# Patient Record
Sex: FEMALE | Race: ASIAN | NOT HISPANIC OR LATINO | URBAN - METROPOLITAN AREA
[De-identification: names, ages, dates, MRNs, and addresses within clinical notes are randomized per-mention and may not be internally consistent; named-entity substitution may affect disease eponyms.]

---

## 2020-06-01 ENCOUNTER — TELEPHONE ENCOUNTER (OUTPATIENT)
Dept: URBAN - METROPOLITAN AREA CLINIC 98 | Facility: CLINIC | Age: 70
End: 2020-06-01

## 2020-06-01 RX ORDER — MESALAMINE 1.2 G/1
TAKE 4 TABLETS (4.8 GRAM) BY ORAL ROUTE ONCE DAILY WITH A MEAL FOR 90 DAYS TABLET, DELAYED RELEASE ORAL 1
Qty: 360 | Refills: 3

## 2020-06-02 ENCOUNTER — TELEPHONE ENCOUNTER (OUTPATIENT)
Dept: URBAN - METROPOLITAN AREA CLINIC 92 | Facility: CLINIC | Age: 70
End: 2020-06-02

## 2020-10-12 ENCOUNTER — TELEPHONE ENCOUNTER (OUTPATIENT)
Dept: URBAN - METROPOLITAN AREA CLINIC 98 | Facility: CLINIC | Age: 70
End: 2020-10-12

## 2020-10-12 RX ORDER — SODIUM, POTASSIUM,MAG SULFATES 17.5-3.13G
17.5-13.3-1.6 GM/177ML SOLUTION, RECONSTITUTED, ORAL ORAL
Qty: 1 BOX | OUTPATIENT
Start: 2020-10-13

## 2020-12-21 ENCOUNTER — OFFICE VISIT (OUTPATIENT)
Dept: URBAN - METROPOLITAN AREA SURGERY CENTER 18 | Facility: SURGERY CENTER | Age: 70
End: 2020-12-21

## 2021-02-22 ENCOUNTER — TELEPHONE ENCOUNTER (OUTPATIENT)
Dept: URBAN - METROPOLITAN AREA CLINIC 98 | Facility: CLINIC | Age: 71
End: 2021-02-22

## 2021-03-16 ENCOUNTER — TELEPHONE ENCOUNTER (OUTPATIENT)
Dept: URBAN - METROPOLITAN AREA CLINIC 98 | Facility: CLINIC | Age: 71
End: 2021-03-16

## 2021-03-18 ENCOUNTER — OFFICE VISIT (OUTPATIENT)
Dept: URBAN - METROPOLITAN AREA SURGERY CENTER 18 | Facility: SURGERY CENTER | Age: 71
End: 2021-03-18

## 2021-04-06 PROBLEM — 41364008: Status: ACTIVE | Noted: 2020-10-13

## 2021-04-12 ENCOUNTER — OFFICE VISIT (OUTPATIENT)
Dept: URBAN - METROPOLITAN AREA SURGERY CENTER 18 | Facility: SURGERY CENTER | Age: 71
End: 2021-04-12
Payer: COMMERCIAL

## 2021-04-12 ENCOUNTER — CLAIMS CREATED FROM THE CLAIM WINDOW (OUTPATIENT)
Dept: URBAN - METROPOLITAN AREA CLINIC 4 | Facility: CLINIC | Age: 71
End: 2021-04-12
Payer: COMMERCIAL

## 2021-04-12 DIAGNOSIS — K51.80 OTHER ULCERATIVE COLITIS WITHOUT COMPLICATIONS: ICD-10-CM

## 2021-04-12 DIAGNOSIS — K51.00 CHRONIC PANCOLONIC ULCERATIVE COLITIS: ICD-10-CM

## 2021-04-12 DIAGNOSIS — K63.89 STENOSIS OF ILEOCECAL VALVE: ICD-10-CM

## 2021-04-12 PROCEDURE — 88305 TISSUE EXAM BY PATHOLOGIST: CPT | Performed by: PATHOLOGY

## 2021-04-12 PROCEDURE — G8907 PT DOC NO EVENTS ON DISCHARG: HCPCS | Performed by: INTERNAL MEDICINE

## 2021-04-12 PROCEDURE — 45380 COLONOSCOPY AND BIOPSY: CPT | Performed by: INTERNAL MEDICINE

## 2021-04-12 RX ORDER — CYANOCOBALAMIN (VITAMIN B-12) 500 MCG
TABLET ORAL
Qty: 0 | Refills: 0 | Status: ACTIVE | COMMUNITY
Start: 1900-01-01 | End: 1900-01-01

## 2021-04-12 RX ORDER — RIBOFLAVIN (VITAMIN B2) 100 MG
TABLET ORAL
Qty: 0 | Refills: 0 | Status: ACTIVE | COMMUNITY
Start: 1900-01-01 | End: 1900-01-01

## 2021-04-12 RX ORDER — MESALAMINE 1.2 G/1
TAKE 4 TABLETS (4.8 GRAM) BY ORAL ROUTE ONCE DAILY WITH A MEAL FOR 90 DAYS TABLET, DELAYED RELEASE ORAL 1
Qty: 360 | Refills: 3 | Status: ACTIVE | COMMUNITY

## 2021-04-12 RX ORDER — SODIUM, POTASSIUM,MAG SULFATES 17.5-3.13G
17.5-13.3-1.6 GM/177ML SOLUTION, RECONSTITUTED, ORAL ORAL
Qty: 1 BOX | Status: ACTIVE | COMMUNITY
Start: 2020-10-13

## 2021-04-12 RX ORDER — DULOXETINE HCL 30 MG
CAPSULE,DELAYED RELEASE (ENTERIC COATED) ORAL
Qty: 0 | Refills: 0 | Status: ACTIVE | COMMUNITY
Start: 1900-01-01 | End: 1900-01-01

## 2021-07-06 ENCOUNTER — ERX REFILL RESPONSE (OUTPATIENT)
Dept: URBAN - METROPOLITAN AREA CLINIC 98 | Facility: CLINIC | Age: 71
End: 2021-07-06

## 2021-07-06 RX ORDER — MESALAMINE 1.2 G/1
TAKE 4 TABLETS (4.8 GRAM) BY ORAL ROUTE ONCE DAILY WITH A MEAL TABLET, DELAYED RELEASE ORAL
Qty: 120 TABLET | Refills: 12 | OUTPATIENT

## 2021-07-06 RX ORDER — MESALAMINE 1.2 G/1
TAKE 4 TABLETS (4.8 GRAM) BY ORAL ROUTE ONCE DAILY WITH A MEAL FOR 90 DAYS TABLET, DELAYED RELEASE ORAL 1
Qty: 360 | Refills: 3 | OUTPATIENT

## 2022-05-16 ENCOUNTER — TELEPHONE ENCOUNTER (OUTPATIENT)
Dept: URBAN - METROPOLITAN AREA CLINIC 6 | Facility: CLINIC | Age: 72
End: 2022-05-16

## 2022-05-16 RX ORDER — VALACYCLOVIR HYDROCHLORIDE 500 MG/1
1 TABLET TABLET, FILM COATED ORAL ONCE A DAY
Qty: 10 | OUTPATIENT
Start: 2022-05-16 | End: 2022-05-26

## 2022-05-16 RX ORDER — MESALAMINE 1.2 G/1
TAKE 4 TABLETS (4.8 GRAM) BY ORAL ROUTE ONCE DAILY WITH A MEAL TABLET, DELAYED RELEASE ORAL
Qty: 120 TABLET | Refills: 12

## 2022-09-14 ENCOUNTER — TELEPHONE ENCOUNTER (OUTPATIENT)
Dept: URBAN - METROPOLITAN AREA CLINIC 98 | Facility: CLINIC | Age: 72
End: 2022-09-14

## 2022-10-07 ENCOUNTER — TELEPHONE ENCOUNTER (OUTPATIENT)
Dept: URBAN - METROPOLITAN AREA CLINIC 6 | Facility: CLINIC | Age: 72
End: 2022-10-07

## 2023-03-17 ENCOUNTER — P2P PATIENT RECORD (OUTPATIENT)
Age: 73
End: 2023-03-17

## 2023-06-13 ENCOUNTER — CLAIMS CREATED FROM THE CLAIM WINDOW (OUTPATIENT)
Dept: URBAN - METROPOLITAN AREA CLINIC 4 | Facility: CLINIC | Age: 73
End: 2023-06-13
Payer: COMMERCIAL

## 2023-06-13 ENCOUNTER — TELEPHONE ENCOUNTER (OUTPATIENT)
Dept: URBAN - METROPOLITAN AREA CLINIC 98 | Facility: CLINIC | Age: 73
End: 2023-06-13

## 2023-06-13 ENCOUNTER — OFFICE VISIT (OUTPATIENT)
Dept: URBAN - METROPOLITAN AREA SURGERY CENTER 18 | Facility: SURGERY CENTER | Age: 73
End: 2023-06-13
Payer: COMMERCIAL

## 2023-06-13 DIAGNOSIS — K63.5 BENIGN COLON POLYP: ICD-10-CM

## 2023-06-13 DIAGNOSIS — K51.80 CHRONIC PANCOLONIC ULCERATIVE COLITIS: ICD-10-CM

## 2023-06-13 DIAGNOSIS — K51.80 OTHER ULCERATIVE COLITIS WITHOUT COMPLICATIONS: ICD-10-CM

## 2023-06-13 PROCEDURE — 88305 TISSUE EXAM BY PATHOLOGIST: CPT | Performed by: PATHOLOGY

## 2023-06-13 PROCEDURE — 45380 COLONOSCOPY AND BIOPSY: CPT | Performed by: INTERNAL MEDICINE

## 2023-06-13 PROCEDURE — 45385 COLONOSCOPY W/LESION REMOVAL: CPT | Performed by: INTERNAL MEDICINE

## 2023-06-13 PROCEDURE — G8907 PT DOC NO EVENTS ON DISCHARG: HCPCS | Performed by: INTERNAL MEDICINE

## 2023-06-13 RX ORDER — MESALAMINE 1.2 G/1
TAKE 4 TABLETS (4.8 GRAM) BY ORAL ROUTE ONCE DAILY WITH A MEAL TABLET, DELAYED RELEASE ORAL
Qty: 120 TABLET | Refills: 3
End: 2023-09-11

## 2023-06-13 RX ORDER — MESALAMINE 4 G/60ML
AS DIRECTED SUSPENSION RECTAL
Qty: 30 KIT | Refills: 1 | OUTPATIENT
Start: 2023-06-13

## 2023-06-13 RX ORDER — CYANOCOBALAMIN (VITAMIN B-12) 500 MCG
TABLET ORAL
Qty: 0 | Refills: 0 | Status: ACTIVE | COMMUNITY
Start: 1900-01-01 | End: 1900-01-01

## 2023-06-13 RX ORDER — DULOXETINE HCL 30 MG
CAPSULE,DELAYED RELEASE (ENTERIC COATED) ORAL
Qty: 0 | Refills: 0 | Status: ACTIVE | COMMUNITY
Start: 1900-01-01 | End: 1900-01-01

## 2023-06-13 RX ORDER — SODIUM, POTASSIUM,MAG SULFATES 17.5-3.13G
17.5-13.3-1.6 GM/177ML SOLUTION, RECONSTITUTED, ORAL ORAL
Qty: 1 BOX | Status: ACTIVE | COMMUNITY
Start: 2020-10-13

## 2023-06-13 RX ORDER — MESALAMINE 1.2 G/1
TAKE 4 TABLETS (4.8 GRAM) BY ORAL ROUTE ONCE DAILY WITH A MEAL TABLET, DELAYED RELEASE ORAL
Qty: 120 TABLET | Refills: 12 | Status: ACTIVE | COMMUNITY

## 2023-06-13 RX ORDER — RIBOFLAVIN (VITAMIN B2) 100 MG
TABLET ORAL
Qty: 0 | Refills: 0 | Status: ACTIVE | COMMUNITY
Start: 1900-01-01 | End: 1900-01-01

## 2023-12-27 ENCOUNTER — ERX REFILL RESPONSE (OUTPATIENT)
Dept: URBAN - METROPOLITAN AREA CLINIC 98 | Facility: CLINIC | Age: 73
End: 2023-12-27

## 2023-12-27 RX ORDER — MESALAMINE 1.2 G/1
TAKE 4 TABLETS (4.8 GRAM) BY ORAL ROUTE ONCE DAILY WITH A MEAL 90 DAYS TABLET, DELAYED RELEASE ORAL
Qty: 120 TABLET | Refills: 2 | OUTPATIENT

## 2023-12-27 RX ORDER — MESALAMINE 1.2 G/1
TAKE 4 TABLETS (4.8 GRAM) BY ORAL ROUTE ONCE DAILY WITH A MEAL 90 DAYS TABLET, DELAYED RELEASE ORAL
Qty: 120 TABLET | Refills: 3 | OUTPATIENT

## 2024-01-19 ENCOUNTER — TELEPHONE ENCOUNTER (OUTPATIENT)
Dept: URBAN - METROPOLITAN AREA CLINIC 97 | Facility: CLINIC | Age: 74
End: 2024-01-19

## 2024-01-19 RX ORDER — PREDNISONE 10 MG/1
2 TABLET TABLET ORAL ONCE A DAY
Qty: 180 TABLET | Refills: 0 | OUTPATIENT
Start: 2024-01-21 | End: 2024-04-20

## 2024-06-14 ENCOUNTER — DASHBOARD ENCOUNTERS (OUTPATIENT)
Age: 74
End: 2024-06-14

## 2024-06-14 ENCOUNTER — LAB OUTSIDE AN ENCOUNTER (OUTPATIENT)
Dept: URBAN - METROPOLITAN AREA CLINIC 98 | Facility: CLINIC | Age: 74
End: 2024-06-14

## 2024-06-14 ENCOUNTER — OFFICE VISIT (OUTPATIENT)
Dept: URBAN - METROPOLITAN AREA CLINIC 98 | Facility: CLINIC | Age: 74
End: 2024-06-14

## 2024-06-14 VITALS
WEIGHT: 114 LBS | SYSTOLIC BLOOD PRESSURE: 114 MMHG | HEART RATE: 78 BPM | BODY MASS INDEX: 19.46 KG/M2 | TEMPERATURE: 97.7 F | DIASTOLIC BLOOD PRESSURE: 68 MMHG | HEIGHT: 64 IN

## 2024-06-14 RX ORDER — CYANOCOBALAMIN (VITAMIN B-12) 500 MCG
TABLET ORAL
Qty: 0 | Refills: 0 | Status: ACTIVE | COMMUNITY
Start: 1900-01-01

## 2024-06-14 RX ORDER — DULOXETINE HCL 30 MG
CAPSULE,DELAYED RELEASE (ENTERIC COATED) ORAL
Qty: 0 | Refills: 0 | Status: ACTIVE | COMMUNITY
Start: 1900-01-01

## 2024-06-14 RX ORDER — RIBOFLAVIN (VITAMIN B2) 100 MG
TABLET ORAL
Qty: 0 | Refills: 0 | Status: ACTIVE | COMMUNITY
Start: 1900-01-01

## 2024-06-14 RX ORDER — SODIUM SULFATE, POTASSIUM SULFATE AND MAGNESIUM SULFATE ORAL 1.6; 3.13; 17.5 G/177ML; G/177ML; G/177ML
354ML SOLUTION ORAL
Qty: 354 ML | Refills: 0 | OUTPATIENT
Start: 2024-06-14 | End: 2024-06-15

## 2024-06-14 RX ORDER — SODIUM, POTASSIUM,MAG SULFATES 17.5-3.13G
17.5-13.3-1.6 GM/177ML SOLUTION, RECONSTITUTED, ORAL ORAL
Qty: 1 BOX | Status: ON HOLD | COMMUNITY
Start: 2020-10-13

## 2024-06-14 RX ORDER — MESALAMINE 1.2 G/1
TAKE 4 TABLETS (4.8 GRAM) BY ORAL ROUTE ONCE DAILY WITH A MEAL 90 DAYS TABLET, DELAYED RELEASE ORAL
Qty: 120 TABLET | Refills: 2 | Status: ACTIVE | COMMUNITY

## 2024-06-14 RX ORDER — MESALAMINE 4 G/60ML
AS DIRECTED SUSPENSION RECTAL
Qty: 30 KIT | Refills: 1 | Status: ACTIVE | COMMUNITY
Start: 2023-06-13

## 2024-06-14 NOTE — HPI-TODAY'S VISIT:
6/14/24 - Here to follow- up flare ulcerative colitis - Colonoscopy 6/13/23- active colitis - 2/5-3/19 food poisoning in [place]; vomiting, diarrhea - Diarrhea never went away - 4/2- ER [facility] Tx- e.coli, colonic inflammation; admitted for 5 days - Tx. steroids, antibiotics IV - Finished prednisone taper on 6/4 - Now- on mesalamine 4 capsules per day - BM 1 per day; dark stools on iron - Leaving the end of June 28th for [place]- 2 months - Patient concerned with "pin prick sensation all over body worse at nigh"

## 2024-06-17 ENCOUNTER — TELEPHONE ENCOUNTER (OUTPATIENT)
Dept: URBAN - METROPOLITAN AREA CLINIC 98 | Facility: CLINIC | Age: 74
End: 2024-06-17

## 2024-06-17 LAB
A/G RATIO: 1.7
ABSOLUTE BASOPHILS: 53
ABSOLUTE EOSINOPHILS: 38
ABSOLUTE LYMPHOCYTES: 2584
ABSOLUTE MONOCYTES: 722
ABSOLUTE NEUTROPHILS: 4203
ALBUMIN: 4.4
ALKALINE PHOSPHATASE: 57
ALT (SGPT): <3
AST (SGOT): 23
BASOPHILS: 0.7
BILIRUBIN, TOTAL: 0.4
BUN/CREATININE RATIO: (no result)
BUN: 12
C-REACTIVE PROTEIN, QUANT: <3
CALCIUM: 10.1
CARBON DIOXIDE, TOTAL: 28
CHLORIDE: 103
CREATININE: 0.66
EGFR: 93
EOSINOPHILS: 0.5
FOLATE, SERUM: >24
GLOBULIN, TOTAL: 2.6
GLUCOSE: 88
HEMATOCRIT: 37.7
HEMOGLOBIN: 12.1
LYMPHOCYTES: 34
MCH: 30.3
MCHC: 32.1
MCV: 94.3
MONOCYTES: 9.5
MPV: 10.1
NEUTROPHILS: 55.3
PLATELET COUNT: 355
POTASSIUM: 5.2
PROTEIN, TOTAL: 7
RDW: 15.2
RED BLOOD CELL COUNT: 4
SED RATE BY MODIFIED: 9
SODIUM: 140
VITAMIN B12: 423
VITAMIN D,25-OH,TOTAL,IA: 49
WHITE BLOOD CELL COUNT: 7.6

## 2024-06-29 LAB — CALPROTECTIN, FECAL: 50

## 2024-09-10 ENCOUNTER — OFFICE VISIT (OUTPATIENT)
Dept: URBAN - METROPOLITAN AREA SURGERY CENTER 18 | Facility: SURGERY CENTER | Age: 74
End: 2024-09-10

## 2024-10-08 ENCOUNTER — CLAIMS CREATED FROM THE CLAIM WINDOW (OUTPATIENT)
Dept: URBAN - METROPOLITAN AREA CLINIC 4 | Facility: CLINIC | Age: 74
End: 2024-10-08
Payer: COMMERCIAL

## 2024-10-08 ENCOUNTER — TELEPHONE ENCOUNTER (OUTPATIENT)
Dept: URBAN - METROPOLITAN AREA CLINIC 98 | Facility: CLINIC | Age: 74
End: 2024-10-08

## 2024-10-08 ENCOUNTER — OFFICE VISIT (OUTPATIENT)
Dept: URBAN - METROPOLITAN AREA SURGERY CENTER 18 | Facility: SURGERY CENTER | Age: 74
End: 2024-10-08
Payer: COMMERCIAL

## 2024-10-08 DIAGNOSIS — K51.80 OTHER ULCERATIVE COLITIS WITHOUT COMPLICATIONS: ICD-10-CM

## 2024-10-08 DIAGNOSIS — K51.80 OTHER ULCERATIVE COLITIS: ICD-10-CM

## 2024-10-08 DIAGNOSIS — K63.89 OTHER SPECIFIED DISEASES OF INTESTINE: ICD-10-CM

## 2024-10-08 PROCEDURE — 00812 ANES LWR INTST SCR COLSC: CPT | Performed by: ANESTHESIOLOGY

## 2024-10-08 PROCEDURE — 45380 COLONOSCOPY AND BIOPSY: CPT | Performed by: INTERNAL MEDICINE

## 2024-10-08 PROCEDURE — 88305 TISSUE EXAM BY PATHOLOGIST: CPT | Performed by: PATHOLOGY

## 2024-10-08 RX ORDER — CYANOCOBALAMIN (VITAMIN B-12) 500 MCG
TABLET ORAL
Qty: 0 | Refills: 0 | Status: ACTIVE | COMMUNITY
Start: 1900-01-01

## 2024-10-08 RX ORDER — SODIUM, POTASSIUM,MAG SULFATES 17.5-3.13G
17.5-13.3-1.6 GM/177ML SOLUTION, RECONSTITUTED, ORAL ORAL
Qty: 1 BOX | Status: ON HOLD | COMMUNITY
Start: 2020-10-13

## 2024-10-08 RX ORDER — MESALAMINE 1.2 G/1
TAKE 4 TABLETS (4.8 GRAM) BY ORAL ROUTE ONCE DAILY WITH A MEAL 90 DAYS TABLET, DELAYED RELEASE ORAL
Qty: 120 TABLET | Refills: 2 | Status: ACTIVE | COMMUNITY

## 2024-10-08 RX ORDER — DULOXETINE HCL 30 MG
CAPSULE,DELAYED RELEASE (ENTERIC COATED) ORAL
Qty: 0 | Refills: 0 | Status: ACTIVE | COMMUNITY
Start: 1900-01-01

## 2024-10-08 RX ORDER — RIBOFLAVIN (VITAMIN B2) 100 MG
TABLET ORAL
Qty: 0 | Refills: 0 | Status: ACTIVE | COMMUNITY
Start: 1900-01-01

## 2024-10-08 RX ORDER — MESALAMINE 4 G/60ML
AS DIRECTED SUSPENSION RECTAL
Qty: 30 KIT | Refills: 1 | Status: ACTIVE | COMMUNITY
Start: 2023-06-13

## 2024-10-10 ENCOUNTER — TELEPHONE ENCOUNTER (OUTPATIENT)
Dept: URBAN - METROPOLITAN AREA CLINIC 97 | Facility: CLINIC | Age: 74
End: 2024-10-10

## 2024-10-10 RX ORDER — MESALAMINE 4 G/60ML
AS DIRECTED SUSPENSION RECTAL
Qty: 45 KIT | Refills: 0 | OUTPATIENT
Start: 2024-10-17 | End: 2024-11-30

## 2024-10-24 ENCOUNTER — TELEPHONE ENCOUNTER (OUTPATIENT)
Dept: URBAN - METROPOLITAN AREA CLINIC 97 | Facility: CLINIC | Age: 74
End: 2024-10-24

## 2024-10-24 ENCOUNTER — OFFICE VISIT (OUTPATIENT)
Dept: URBAN - METROPOLITAN AREA CLINIC 98 | Facility: CLINIC | Age: 74
End: 2024-10-24
Payer: COMMERCIAL

## 2024-10-24 VITALS
HEIGHT: 64 IN | DIASTOLIC BLOOD PRESSURE: 65 MMHG | BODY MASS INDEX: 19.19 KG/M2 | TEMPERATURE: 97 F | WEIGHT: 112.4 LBS | SYSTOLIC BLOOD PRESSURE: 108 MMHG | HEART RATE: 90 BPM

## 2024-10-24 DIAGNOSIS — K51.318 ULCERATIVE RECTOSIGMOIDITIS WITH OTHER COMPLICATION: ICD-10-CM

## 2024-10-24 PROCEDURE — 99214 OFFICE O/P EST MOD 30 MIN: CPT | Performed by: INTERNAL MEDICINE

## 2024-10-24 RX ORDER — RIBOFLAVIN (VITAMIN B2) 100 MG
TABLET ORAL
Qty: 0 | Refills: 0 | Status: ON HOLD | COMMUNITY
Start: 1900-01-01

## 2024-10-24 RX ORDER — MESALAMINE 4 G/60ML
AS DIRECTED SUSPENSION RECTAL
Qty: 45 KIT | Refills: 0
Start: 2024-10-17 | End: 2024-12-08

## 2024-10-24 RX ORDER — MESALAMINE 4 G/60ML
AS DIRECTED SUSPENSION RECTAL
Qty: 30 KIT | Refills: 1 | Status: ON HOLD | COMMUNITY
Start: 2023-06-13

## 2024-10-24 RX ORDER — CYANOCOBALAMIN (VITAMIN B-12) 500 MCG
TABLET ORAL
Qty: 0 | Refills: 0 | Status: ON HOLD | COMMUNITY
Start: 1900-01-01

## 2024-10-24 RX ORDER — MESALAMINE 4 G/60ML
AS DIRECTED SUSPENSION RECTAL
Qty: 45 KIT | Refills: 0 | Status: ON HOLD | COMMUNITY
Start: 2024-10-17 | End: 2024-11-30

## 2024-10-24 RX ORDER — SODIUM, POTASSIUM,MAG SULFATES 17.5-3.13G
17.5-13.3-1.6 GM/177ML SOLUTION, RECONSTITUTED, ORAL ORAL
Qty: 1 BOX | COMMUNITY
Start: 2020-10-13

## 2024-10-24 RX ORDER — MESALAMINE 1.2 G/1
TAKE 4 TABLETS (4.8 GRAM) BY ORAL ROUTE ONCE DAILY WITH A MEAL 90 DAYS TABLET, DELAYED RELEASE ORAL
Qty: 120 TABLET | Refills: 2 | Status: ACTIVE | COMMUNITY

## 2024-10-24 RX ORDER — DULOXETINE HCL 30 MG
CAPSULE,DELAYED RELEASE (ENTERIC COATED) ORAL
Qty: 0 | Refills: 0 | Status: ON HOLD | COMMUNITY
Start: 1900-01-01

## 2024-10-24 NOTE — HPI-TODAY'S VISIT:
6/14/24- Maren High NP - Here to follow- up flare ulcerative colitis - Colonoscopy 6/13/23- active colitis - 2/5-3/19 food poisoning in [place]; vomiting, diarrhea - Diarrhea never went away - 4/2- ER [facility] Tx- e.coli, colonic inflammation; admitted for 5 days - Tx. steroids, antibiotics IV - Finished prednisone taper on 6/4 - Now- on mesalamine 4 capsules per day - BM 1 per day; dark stools on iron - Leaving the end of June 28th for [place]- 2 months - Patient concerned with "pin prick sensation all over body worse at night"  Present - on mesalamine 4 pills- stopped it at colonoscopy - on enema daily - improved - does not want to do biologics as had breast cancer. - colon with partially treated disease- inflammation in the left side.

## 2024-10-25 ENCOUNTER — TELEPHONE ENCOUNTER (OUTPATIENT)
Dept: URBAN - METROPOLITAN AREA CLINIC 97 | Facility: CLINIC | Age: 74
End: 2024-10-25

## 2024-10-25 RX ORDER — MESALAMINE 1.2 G/1
TAKE 4 TABLETS (4.8 GRAM) BY ORAL ROUTE ONCE DAILY WITH A MEAL 90 DAYS TABLET, DELAYED RELEASE ORAL
Qty: 120 | Refills: 2

## 2025-03-17 ENCOUNTER — TELEPHONE ENCOUNTER (OUTPATIENT)
Dept: URBAN - METROPOLITAN AREA CLINIC 97 | Facility: CLINIC | Age: 75
End: 2025-03-17

## 2025-03-17 RX ORDER — MESALAMINE 1.2 G/1
TAKE 4 TABLETS (4.8 GRAM) BY ORAL ROUTE ONCE DAILY WITH A MEAL 90 DAYS TABLET, DELAYED RELEASE ORAL
Qty: 120 | Refills: 2
End: 2025-06-15

## 2025-06-20 ENCOUNTER — TELEPHONE ENCOUNTER (OUTPATIENT)
Dept: URBAN - METROPOLITAN AREA CLINIC 97 | Facility: CLINIC | Age: 75
End: 2025-06-20

## 2025-06-20 RX ORDER — MESALAMINE 1.2 G/1
TAKE 4 TABLETS (4.8 GRAM) BY ORAL ROUTE ONCE DAILY WITH A MEAL 90 DAYS TABLET, DELAYED RELEASE ORAL
Qty: 120 | Refills: 5